# Patient Record
Sex: FEMALE | Race: WHITE | NOT HISPANIC OR LATINO
[De-identification: names, ages, dates, MRNs, and addresses within clinical notes are randomized per-mention and may not be internally consistent; named-entity substitution may affect disease eponyms.]

---

## 2021-10-04 ENCOUNTER — RESULT REVIEW (OUTPATIENT)
Age: 66
End: 2021-10-04

## 2021-11-15 PROBLEM — Z00.00 ENCOUNTER FOR PREVENTIVE HEALTH EXAMINATION: Status: ACTIVE | Noted: 2021-11-15

## 2021-12-10 ENCOUNTER — APPOINTMENT (OUTPATIENT)
Dept: OBGYN | Facility: CLINIC | Age: 66
End: 2021-12-10
Payer: MEDICARE

## 2021-12-10 VITALS
HEIGHT: 59 IN | SYSTOLIC BLOOD PRESSURE: 146 MMHG | DIASTOLIC BLOOD PRESSURE: 86 MMHG | WEIGHT: 195 LBS | BODY MASS INDEX: 39.31 KG/M2

## 2021-12-10 DIAGNOSIS — Z86.79 PERSONAL HISTORY OF OTHER DISEASES OF THE CIRCULATORY SYSTEM: ICD-10-CM

## 2021-12-10 DIAGNOSIS — N95.0 POSTMENOPAUSAL BLEEDING: ICD-10-CM

## 2021-12-10 DIAGNOSIS — Z86.39 PERSONAL HISTORY OF OTHER ENDOCRINE, NUTRITIONAL AND METABOLIC DISEASE: ICD-10-CM

## 2021-12-10 DIAGNOSIS — Z80.1 FAMILY HISTORY OF MALIGNANT NEOPLASM OF TRACHEA, BRONCHUS AND LUNG: ICD-10-CM

## 2021-12-10 PROCEDURE — 99203 OFFICE O/P NEW LOW 30 MIN: CPT

## 2021-12-10 RX ORDER — SIMVASTATIN 10 MG/1
10 TABLET, FILM COATED ORAL
Refills: 0 | Status: ACTIVE | COMMUNITY

## 2021-12-10 RX ORDER — LEVOTHYROXINE SODIUM 137 UG/1
TABLET ORAL
Refills: 0 | Status: ACTIVE | COMMUNITY

## 2021-12-10 NOTE — HISTORY OF PRESENT ILLNESS
[FreeTextEntry1] : FRANSICO LAKE is a 66 year old presenting for consult. Pt with post menopausal bleeding. States last time in August, denies constitutional symptoms. \par TVUS 11/21: 11cm uterus with 1cm endometrium, had an attempt at a d&c with Dr Arias unsuccessful due to stenosis.\par Hx of abnormal pap and colpo 2021 showing SHARMILA 1.\par Denies any family hx of ovarian, endometrial uterine cancer\par denies an hx of DVT\par \par PMH: HTN, HLD, hypothyroidism\par PSH: D&C\par Meds: Simvastatin, Synthroid\par NKDA\par

## 2021-12-10 NOTE — PHYSICAL EXAM
[Appropriately responsive] : appropriately responsive [Alert] : alert [No Acute Distress] : no acute distress [No Lymphadenopathy] : no lymphadenopathy [Soft] : soft [Non-tender] : non-tender [Non-distended] : non-distended [No HSM] : No HSM [No Lesions] : no lesions [No Mass] : no mass [Oriented x3] : oriented x3 [Labia Majora] : normal [Labia Minora] : normal [Normal] : normal [Uterine Adnexae] : normal [FreeTextEntry7] : obese  [Enlarged ___ wks] : enlarged [unfilled] ~Uweeks [FreeTextEntry6] : 12cm uteurs, limited bimanuel due to habitus

## 2022-01-18 ENCOUNTER — OUTPATIENT (OUTPATIENT)
Dept: OUTPATIENT SERVICES | Facility: HOSPITAL | Age: 67
LOS: 1 days | End: 2022-01-18
Payer: MEDICARE

## 2022-01-18 VITALS
TEMPERATURE: 98 F | RESPIRATION RATE: 16 BRPM | OXYGEN SATURATION: 98 % | DIASTOLIC BLOOD PRESSURE: 86 MMHG | HEIGHT: 59 IN | HEART RATE: 101 BPM | SYSTOLIC BLOOD PRESSURE: 139 MMHG | WEIGHT: 192.9 LBS

## 2022-01-18 DIAGNOSIS — N95.0 POSTMENOPAUSAL BLEEDING: ICD-10-CM

## 2022-01-18 DIAGNOSIS — Z98.890 OTHER SPECIFIED POSTPROCEDURAL STATES: Chronic | ICD-10-CM

## 2022-01-18 DIAGNOSIS — Z01.818 ENCOUNTER FOR OTHER PREPROCEDURAL EXAMINATION: ICD-10-CM

## 2022-01-18 LAB
A1C WITH ESTIMATED AVERAGE GLUCOSE RESULT: 5.9 % — HIGH (ref 4–5.6)
ANION GAP SERPL CALC-SCNC: 15 MMOL/L — SIGNIFICANT CHANGE UP (ref 5–17)
BLD GP AB SCN SERPL QL: NEGATIVE — SIGNIFICANT CHANGE UP
BUN SERPL-MCNC: 16 MG/DL — SIGNIFICANT CHANGE UP (ref 7–23)
CALCIUM SERPL-MCNC: 9 MG/DL — SIGNIFICANT CHANGE UP (ref 8.4–10.5)
CHLORIDE SERPL-SCNC: 106 MMOL/L — SIGNIFICANT CHANGE UP (ref 96–108)
CO2 SERPL-SCNC: 22 MMOL/L — SIGNIFICANT CHANGE UP (ref 22–31)
CREAT SERPL-MCNC: 0.68 MG/DL — SIGNIFICANT CHANGE UP (ref 0.5–1.3)
ESTIMATED AVERAGE GLUCOSE: 123 MG/DL — HIGH (ref 68–114)
GLUCOSE SERPL-MCNC: 115 MG/DL — HIGH (ref 70–99)
HCT VFR BLD CALC: 47.7 % — HIGH (ref 34.5–45)
HGB BLD-MCNC: 14.9 G/DL — SIGNIFICANT CHANGE UP (ref 11.5–15.5)
MCHC RBC-ENTMCNC: 28.4 PG — SIGNIFICANT CHANGE UP (ref 27–34)
MCHC RBC-ENTMCNC: 31.2 GM/DL — LOW (ref 32–36)
MCV RBC AUTO: 90.9 FL — SIGNIFICANT CHANGE UP (ref 80–100)
NRBC # BLD: 0 /100 WBCS — SIGNIFICANT CHANGE UP (ref 0–0)
PLATELET # BLD AUTO: 233 K/UL — SIGNIFICANT CHANGE UP (ref 150–400)
POTASSIUM SERPL-MCNC: 3.9 MMOL/L — SIGNIFICANT CHANGE UP (ref 3.5–5.3)
POTASSIUM SERPL-SCNC: 3.9 MMOL/L — SIGNIFICANT CHANGE UP (ref 3.5–5.3)
RBC # BLD: 5.25 M/UL — HIGH (ref 3.8–5.2)
RBC # FLD: 14.5 % — SIGNIFICANT CHANGE UP (ref 10.3–14.5)
RH IG SCN BLD-IMP: POSITIVE — SIGNIFICANT CHANGE UP
SODIUM SERPL-SCNC: 143 MMOL/L — SIGNIFICANT CHANGE UP (ref 135–145)
WBC # BLD: 8.72 K/UL — SIGNIFICANT CHANGE UP (ref 3.8–10.5)
WBC # FLD AUTO: 8.72 K/UL — SIGNIFICANT CHANGE UP (ref 3.8–10.5)

## 2022-01-18 PROCEDURE — 86901 BLOOD TYPING SEROLOGIC RH(D): CPT

## 2022-01-18 PROCEDURE — 87086 URINE CULTURE/COLONY COUNT: CPT

## 2022-01-18 PROCEDURE — 86900 BLOOD TYPING SEROLOGIC ABO: CPT

## 2022-01-18 PROCEDURE — 80048 BASIC METABOLIC PNL TOTAL CA: CPT

## 2022-01-18 PROCEDURE — 85027 COMPLETE CBC AUTOMATED: CPT

## 2022-01-18 PROCEDURE — 86850 RBC ANTIBODY SCREEN: CPT

## 2022-01-18 PROCEDURE — G0463: CPT

## 2022-01-18 PROCEDURE — 83036 HEMOGLOBIN GLYCOSYLATED A1C: CPT

## 2022-01-18 RX ORDER — CEFAZOLIN SODIUM 1 G
2000 VIAL (EA) INJECTION ONCE
Refills: 0 | Status: DISCONTINUED | OUTPATIENT
Start: 2022-01-31 | End: 2022-02-14

## 2022-01-18 NOTE — H&P PST ADULT - HISTORY OF PRESENT ILLNESS
66 year old postmenopausal female,  with PMH of Hypothyroidism, HLD, Vitamin D Deficiency with complaint of post-menopausal bleeding. Patient reports that the last episode of bleeding was last August.  She reports that she had an attempt at a D&C, which was unsuccessful due to stenosis of Cervix. Denies fever, chills, continuous bleeding, family history of Ovarian Cancer, or Uterine Cancer. She presents to [facility] today for evaluation prior to scheduled Total Laparoscopic Hysterectomy, Laparoscopic Bilateral Salpingo-oophorectomy,  Cystoscopy, ERP on 2022.      preop covid screen  @ [facility]

## 2022-01-18 NOTE — H&P PST ADULT - OTHER CARE PROVIDERS
Cardiologist-- Dr. Du Thao, [place], [phone]// Endocrinologst--Maggie Lara, [place], [phone]

## 2022-01-18 NOTE — H&P PST ADULT - NEGATIVE ENDOCRINE SYMPTOMS
Admitted with hemoglobin of 11 5, hemoglobin today is 6 3  -repeat hemoglobin is still 6 3, will transfuse 1 unit PRBC  -will check stool occult  -monitor H&H and transfuse further as needed  -thoroughly examined patient, no abdominal pain, no back pain, no areas of ecchymosis or hematoma visualized no cold intolerance/no heat intolerance

## 2022-01-18 NOTE — H&P PST ADULT - WEIGHT IN LBS
192.9 No. MARGE screening performed.  STOP BANG Legend: 0-2 = LOW Risk; 3-4 = INTERMEDIATE Risk; 5-8 = HIGH Risk

## 2022-01-18 NOTE — H&P PST ADULT - PROBLEM SELECTOR PLAN 1
Total Laparoscopic Hysterectomy  Laparoscopic Bilateral Salpingo-oophorectomy,  Cystoscopy  ERP  -cbc, bmp, type and screen, urine culture, A1c done at [facility]  -medical evaluation  -preop instructions  -ERP protocol  -pre-emptive analgesia protocol  -ABO day of surgery  -fingerstick on admit

## 2022-01-18 NOTE — H&P PST ADULT - NSANTHAGERD_ENT_A_CORE
Fredy Abreu (MD)  Orthopaedic Surgery  [address] [place] [address]  Phone: [phone]  Fax: [phone]  Follow Up Time:
Yes

## 2022-01-18 NOTE — H&P PST ADULT - MUSCULOSKELETAL
ROM intact/no joint swelling/no joint erythema/no joint warmth/no calf tenderness/normal strength detailed exam negative

## 2022-01-18 NOTE — H&P PST ADULT - NSICDXPASTSURGICALHX_GEN_ALL_CORE_FT
PAST SURGICAL HISTORY:  H/O breast biopsy -bilaterally    S/P colonoscopy     S/P dilation and curettage -Hysteroscopy-attempted, patient states procedure incomplete due to close Cervix, 2021

## 2022-01-18 NOTE — H&P PST ADULT - NSANTHOSAYNRD_GEN_A_CORE
No. SOFY screening performed.  STOP BANG Legend: 0-2 = LOW Risk; 3-4 = INTERMEDIATE Risk; 5-8 = HIGH Risk

## 2022-01-18 NOTE — H&P PST ADULT - ATTENDING COMMENTS
Patient with PMB, plan for definitive surgical management. Discussed plan for EUA, TLHBSO, cystoscopy. understands learners are part of her case and performing EUA. Reviewed post operative course. Discussed risks of surgery including but not limited to VTE, SSI, damage to bowel, bladder need for transfusion, laparotomy. Reviewed underlying malignancy. Understands if final pathology shows malignancy may need another surgery. All questions answered.   Blanka Garcia MD

## 2022-01-18 NOTE — H&P PST ADULT - FALL HARM RISK - UNIVERSAL INTERVENTIONS
Bed in lowest position, wheels locked, appropriate side rails in place/Call bell, personal items and telephone in reach/Instruct patient to call for assistance before getting out of bed or chair/Non-slip footwear when patient is out of bed/[place] to call system/Physically safe environment - no spills, clutter or unnecessary equipment/Purposeful Proactive Rounding/Room/bathroom lighting operational, light cord in reach

## 2022-01-19 LAB
CULTURE RESULTS: SIGNIFICANT CHANGE UP
SPECIMEN SOURCE: SIGNIFICANT CHANGE UP

## 2022-01-20 PROBLEM — E78.5 HYPERLIPIDEMIA, UNSPECIFIED: Chronic | Status: ACTIVE | Noted: 2022-01-18

## 2022-01-20 PROBLEM — E55.9 VITAMIN D DEFICIENCY, UNSPECIFIED: Chronic | Status: ACTIVE | Noted: 2022-01-18

## 2022-01-20 PROBLEM — E03.9 HYPOTHYROIDISM, UNSPECIFIED: Chronic | Status: ACTIVE | Noted: 2022-01-18

## 2022-01-28 ENCOUNTER — OUTPATIENT (OUTPATIENT)
Dept: OUTPATIENT SERVICES | Facility: HOSPITAL | Age: 67
LOS: 1 days | End: 2022-01-28
Payer: MEDICARE

## 2022-01-28 DIAGNOSIS — Z98.890 OTHER SPECIFIED POSTPROCEDURAL STATES: Chronic | ICD-10-CM

## 2022-01-28 DIAGNOSIS — Z11.52 ENCOUNTER FOR SCREENING FOR COVID-19: ICD-10-CM

## 2022-01-28 LAB — SARS-COV-2 RNA SPEC QL NAA+PROBE: SIGNIFICANT CHANGE UP

## 2022-01-28 PROCEDURE — U0003: CPT

## 2022-01-28 PROCEDURE — U0005: CPT

## 2022-01-28 PROCEDURE — C9803: CPT

## 2022-01-30 ENCOUNTER — TRANSCRIPTION ENCOUNTER (OUTPATIENT)
Age: 67
End: 2022-01-30

## 2022-01-31 ENCOUNTER — OUTPATIENT (OUTPATIENT)
Dept: OUTPATIENT SERVICES | Facility: HOSPITAL | Age: 67
LOS: 1 days | End: 2022-01-31
Payer: MEDICARE

## 2022-01-31 ENCOUNTER — APPOINTMENT (OUTPATIENT)
Dept: OBGYN | Facility: HOSPITAL | Age: 67
End: 2022-01-31

## 2022-01-31 ENCOUNTER — RESULT REVIEW (OUTPATIENT)
Age: 67
End: 2022-01-31

## 2022-01-31 VITALS
RESPIRATION RATE: 18 BRPM | WEIGHT: 192.9 LBS | HEIGHT: 59 IN | DIASTOLIC BLOOD PRESSURE: 83 MMHG | SYSTOLIC BLOOD PRESSURE: 150 MMHG | HEART RATE: 92 BPM | OXYGEN SATURATION: 97 % | TEMPERATURE: 98 F

## 2022-01-31 VITALS
HEART RATE: 77 BPM | OXYGEN SATURATION: 97 % | TEMPERATURE: 97 F | RESPIRATION RATE: 16 BRPM | DIASTOLIC BLOOD PRESSURE: 77 MMHG | SYSTOLIC BLOOD PRESSURE: 113 MMHG

## 2022-01-31 DIAGNOSIS — Z98.890 OTHER SPECIFIED POSTPROCEDURAL STATES: Chronic | ICD-10-CM

## 2022-01-31 DIAGNOSIS — N95.0 POSTMENOPAUSAL BLEEDING: ICD-10-CM

## 2022-01-31 LAB
GLUCOSE BLDC GLUCOMTR-MCNC: 112 MG/DL — HIGH (ref 70–99)
RH IG SCN BLD-IMP: POSITIVE — SIGNIFICANT CHANGE UP

## 2022-01-31 PROCEDURE — C9399: CPT

## 2022-01-31 PROCEDURE — C1765: CPT

## 2022-01-31 PROCEDURE — C1889: CPT

## 2022-01-31 PROCEDURE — 88307 TISSUE EXAM BY PATHOLOGIST: CPT

## 2022-01-31 PROCEDURE — 88307 TISSUE EXAM BY PATHOLOGIST: CPT | Mod: 26

## 2022-01-31 PROCEDURE — 58571 TLH W/T/O 250 G OR LESS: CPT

## 2022-01-31 PROCEDURE — 82962 GLUCOSE BLOOD TEST: CPT

## 2022-01-31 PROCEDURE — 58570 TLH UTERUS 250 G OR LESS: CPT

## 2022-01-31 DEVICE — INTERCEED 3 X 4"
Type: IMPLANTABLE DEVICE | Site: BILATERAL | Status: NON-FUNCTIONAL
Removed: 2022-01-31

## 2022-01-31 DEVICE — VISTASEAL FIBRIN HUMAN 4ML
Type: IMPLANTABLE DEVICE | Site: BILATERAL | Status: NON-FUNCTIONAL
Removed: 2022-01-31

## 2022-01-31 DEVICE — CLIP APPLIER COVIDIEN ENDOCLIP III 5MM
Type: IMPLANTABLE DEVICE | Site: BILATERAL | Status: NON-FUNCTIONAL
Removed: 2022-01-31

## 2022-01-31 RX ORDER — IBUPROFEN 200 MG
600 TABLET ORAL ONCE
Refills: 0 | Status: COMPLETED | OUTPATIENT
Start: 2022-01-31 | End: 2022-01-31

## 2022-01-31 RX ORDER — LEVOTHYROXINE SODIUM 125 MCG
1 TABLET ORAL
Qty: 0 | Refills: 0 | DISCHARGE

## 2022-01-31 RX ORDER — SIMVASTATIN 20 MG/1
1 TABLET, FILM COATED ORAL
Qty: 0 | Refills: 0 | DISCHARGE

## 2022-01-31 RX ORDER — OXYCODONE HYDROCHLORIDE 5 MG/1
1 TABLET ORAL
Qty: 5 | Refills: 0
Start: 2022-01-31

## 2022-01-31 RX ORDER — SIMETHICONE 80 MG/1
1 TABLET, CHEWABLE ORAL
Qty: 56 | Refills: 0
Start: 2022-01-31 | End: 2022-02-13

## 2022-01-31 RX ORDER — LIDOCAINE HCL 20 MG/ML
0.2 VIAL (ML) INJECTION ONCE
Refills: 0 | Status: DISCONTINUED | OUTPATIENT
Start: 2022-01-31 | End: 2022-01-31

## 2022-01-31 RX ORDER — NEBIVOLOL HYDROCHLORIDE 5 MG/1
1 TABLET ORAL
Qty: 0 | Refills: 0 | DISCHARGE

## 2022-01-31 RX ORDER — CHLORHEXIDINE GLUCONATE 213 G/1000ML
1 SOLUTION TOPICAL ONCE
Refills: 0 | Status: DISCONTINUED | OUTPATIENT
Start: 2022-01-31 | End: 2022-01-31

## 2022-01-31 RX ORDER — CHOLECALCIFEROL (VITAMIN D3) 125 MCG
1 CAPSULE ORAL
Qty: 0 | Refills: 0 | DISCHARGE

## 2022-01-31 RX ORDER — KETOROLAC TROMETHAMINE 30 MG/ML
30 SYRINGE (ML) INJECTION ONCE
Refills: 0 | Status: DISCONTINUED | OUTPATIENT
Start: 2022-01-31 | End: 2022-01-31

## 2022-01-31 RX ORDER — IBUPROFEN 200 MG
1 TABLET ORAL
Qty: 56 | Refills: 0
Start: 2022-01-31 | End: 2022-02-13

## 2022-01-31 RX ORDER — HYDROMORPHONE HYDROCHLORIDE 2 MG/ML
0.5 INJECTION INTRAMUSCULAR; INTRAVENOUS; SUBCUTANEOUS
Refills: 0 | Status: DISCONTINUED | OUTPATIENT
Start: 2022-01-31 | End: 2022-01-31

## 2022-01-31 RX ORDER — ACETAMINOPHEN 500 MG
1000 TABLET ORAL ONCE
Refills: 0 | Status: COMPLETED | OUTPATIENT
Start: 2022-01-31 | End: 2022-01-31

## 2022-01-31 RX ORDER — SENNA PLUS 8.6 MG/1
2 TABLET ORAL
Qty: 28 | Refills: 0
Start: 2022-01-31 | End: 2022-02-13

## 2022-01-31 RX ORDER — CELECOXIB 200 MG/1
400 CAPSULE ORAL ONCE
Refills: 0 | Status: COMPLETED | OUTPATIENT
Start: 2022-01-31 | End: 2022-01-31

## 2022-01-31 RX ORDER — DOCUSATE SODIUM 100 MG
50 CAPSULE ORAL
Qty: 700 | Refills: 0
Start: 2022-01-31 | End: 2022-02-13

## 2022-01-31 RX ORDER — ACETAMINOPHEN 500 MG
3 TABLET ORAL
Qty: 168 | Refills: 0
Start: 2022-01-31 | End: 2022-02-13

## 2022-01-31 RX ORDER — SODIUM CHLORIDE 9 MG/ML
3 INJECTION INTRAMUSCULAR; INTRAVENOUS; SUBCUTANEOUS EVERY 8 HOURS
Refills: 0 | Status: DISCONTINUED | OUTPATIENT
Start: 2022-01-31 | End: 2022-01-31

## 2022-01-31 RX ORDER — ONDANSETRON 8 MG/1
1 TABLET, FILM COATED ORAL
Qty: 21 | Refills: 0
Start: 2022-01-31 | End: 2022-02-06

## 2022-01-31 RX ORDER — GABAPENTIN 400 MG/1
300 CAPSULE ORAL ONCE
Refills: 0 | Status: COMPLETED | OUTPATIENT
Start: 2022-01-31 | End: 2022-01-31

## 2022-01-31 RX ORDER — DOCUSATE SODIUM 100 MG
1 CAPSULE ORAL
Qty: 14 | Refills: 0
Start: 2022-01-31 | End: 2022-02-06

## 2022-01-31 RX ORDER — ONDANSETRON 8 MG/1
4 TABLET, FILM COATED ORAL ONCE
Refills: 0 | Status: COMPLETED | OUTPATIENT
Start: 2022-01-31 | End: 2022-01-31

## 2022-01-31 RX ADMIN — Medication 600 MILLIGRAM(S): at 15:08

## 2022-01-31 RX ADMIN — Medication 1000 MILLIGRAM(S): at 12:39

## 2022-01-31 RX ADMIN — ONDANSETRON 4 MILLIGRAM(S): 8 TABLET, FILM COATED ORAL at 14:54

## 2022-01-31 RX ADMIN — Medication 30 MILLIGRAM(S): at 12:40

## 2022-01-31 RX ADMIN — GABAPENTIN 300 MILLIGRAM(S): 400 CAPSULE ORAL at 05:42

## 2022-01-31 RX ADMIN — Medication 600 MILLIGRAM(S): at 15:38

## 2022-01-31 RX ADMIN — Medication 1000 MILLIGRAM(S): at 05:43

## 2022-01-31 RX ADMIN — HYDROMORPHONE HYDROCHLORIDE 0.5 MILLIGRAM(S): 2 INJECTION INTRAMUSCULAR; INTRAVENOUS; SUBCUTANEOUS at 11:54

## 2022-01-31 RX ADMIN — CELECOXIB 400 MILLIGRAM(S): 200 CAPSULE ORAL at 05:42

## 2022-01-31 RX ADMIN — Medication 400 MILLIGRAM(S): at 12:09

## 2022-01-31 RX ADMIN — HYDROMORPHONE HYDROCHLORIDE 0.5 MILLIGRAM(S): 2 INJECTION INTRAMUSCULAR; INTRAVENOUS; SUBCUTANEOUS at 12:39

## 2022-01-31 RX ADMIN — Medication 30 MILLIGRAM(S): at 12:09

## 2022-01-31 NOTE — BRIEF OPERATIVE NOTE - OPERATION/FINDINGS
EUA- mobile 12wk uterus, no adnexal masses, normal vagina and cervix  Laparoscopic  - 12wk uterus with right fundal 3cm type 6 fibroid, normal appearing bilateral fallopian tubes and ovaries  - normal appendix  - normal liver edge  Cystoscopy  - normal and intact bladder mucosa, brisk bilateral ureteral jets

## 2022-01-31 NOTE — BRIEF OPERATIVE NOTE - NSICDXBRIEFPROCEDURE_GEN_ALL_CORE_FT
PROCEDURES:  Hysterectomy, total, laparoscopic, with BSO and cystourethroscopy 31-Jan-2022 12:00:02  Jasmin Haro

## 2022-01-31 NOTE — ASU DISCHARGE PLAN (ADULT/PEDIATRIC) - CARE PROVIDER_API CALL
Blanka Garcia)  OBSGYN  General [address]25  [address] [facility], [address] [place] [address]  Phone: [phone]  Fax: [phone]  Established Patient  Follow Up Time: 2 weeks

## 2022-01-31 NOTE — CHART NOTE - NSCHARTNOTEFT_GEN_A_CORE
1300    R1 GYN POST-OP CHECK NOTE    SUBJECTIVE:    67yo F now POD0 s/p TLH, BSO, and cysto    Pt reports pain well controlled by pain meds. She is not yet out of bed.  De [place] Paz cath removed in the OR. Patient had attempted to void prior, but upon eval, she endorses feeling the need to void. Has not yet had PO. She denies fever, chills, nausea, vomiting, headache, dizziness, chest pain, palpitations, shortness of breath.    ceFAZolin   IVPB 2000 milliGRAM(s) IV Intermittent once  HYDROmorphone  Injectable 0.5 milliGRAM(s) IV Push every 15 minutes PRN  ondansetron Injectable 4 milliGRAM(s) IV Push once PRN      OBJECTIVE:    VITAL SIGNS:  Vital Signs Last 24 Hrs  T(C): 36.2 (31 Jan 2022 11:27), Max: 36.6 (31 Jan 2022 05:49)  T(F): 97.2 (31 Jan 2022 11:27), Max: 97.9 (31 Jan 2022 05:49)  HR: 76 (31 Jan 2022 13:00) (72 - 92)  BP: 112/56 (31 Jan 2022 13:00) (112/56 - 150/83)  BP(mean): 79 (31 Jan 2022 13:00) (79 - 91)  RR: 16 (31 Jan 2022 13:00) (16 - 18)  SpO2: 94% (31 Jan 2022 13:00) (93% - 97%)  CAPILLARY BLOOD GLUCOSE      POCT Blood Glucose.: 112 mg/dL (31 Jan 2022 05:40)      PHYSICAL EXAM:  GEN: No acute distress  CV: Regular rate and rhythm. No murmurs appreciated   LUNGS: Clear to auscultation anteriorly bilaterally. No respiratory distress  ABD: Soft. Mildly distended. Non-tender.  Incision: Lsc incision sites (x3) c/d/i. Umbilical site w/ overlying obsite and lateral incision sites w/ steri-strips   EXT: No calf tenderness. SCDs in place         ASSESSMENT:  67yo F now POD0 s/p TLH, BSO, and cysto. Patient is stable and progressing normally postoperatively.    NEURO: Pain controlled on current regimen  CV: Hemodynamically stable  PULM: Saturating well on RA  GI: Will monitor for pt ability to tolerate PO  : Will monitor for spontaneous void. DTV @754q  HEME: SCDs, early ambulation, and OOB  ID: Afebrile  Dispo: Discharge planning for home if aforementioned GI and  milestones are met     Javier Marks, PGY-1  Obstetrics and Gynecology

## 2022-01-31 NOTE — PRE-ANESTHESIA EVALUATION ADULT - NSANTHPMHFT_GEN_ALL_CORE
HTN dx 5 days ago on rx     66 year old postmenopausal female,  with PMH of Hypothyroidism, HLD, Vitamin D Deficiency with complaint of post-menopausal bleeding. Patient reports that the last episode of bleeding was last August.  She reports that she had an attempt at a D&C, which was unsuccessful due to stenosis of Cervix. Denies fever, chills, continuous bleeding, family history of Ovarian Cancer, or Uterine Cancer.  good ET nonCP/SOB

## 2022-01-31 NOTE — PATIENT PROFILE ADULT - FALL HARM RISK - UNIVERSAL INTERVENTIONS
Bed in lowest position, wheels locked, appropriate side rails in place/Call bell, personal items and telephone in reach/Instruct patient to call for assistance before getting out of bed or chair/Non-slip footwear when patient is out of bed/[place] to call system/Physically safe environment - no spills, clutter or unnecessary equipment/Purposeful Proactive Rounding/Room/bathroom lighting operational, light cord in reach

## 2022-02-15 ENCOUNTER — APPOINTMENT (OUTPATIENT)
Dept: OBGYN | Facility: CLINIC | Age: 67
End: 2022-02-15
Payer: MEDICARE

## 2022-02-15 VITALS
SYSTOLIC BLOOD PRESSURE: 138 MMHG | WEIGHT: 195 LBS | HEIGHT: 59 IN | DIASTOLIC BLOOD PRESSURE: 74 MMHG | BODY MASS INDEX: 39.31 KG/M2

## 2022-02-15 LAB — SURGICAL PATHOLOGY STUDY: SIGNIFICANT CHANGE UP

## 2022-02-15 PROCEDURE — 99024 POSTOP FOLLOW-UP VISIT: CPT

## 2022-02-15 NOTE — HISTORY OF PRESENT ILLNESS
[Pain is well-controlled] : pain is well-controlled [Clean/Dry/Intact] : clean, dry and intact [Healed] : healed [Pathology reviewed] : pathology reviewed [Fever] : no fever [Chills] : no chills [Nausea] : no nausea [Vomiting] : no vomiting [Diarrhea] : no diarrhea [Vaginal Bleeding] : no vaginal bleeding [Pelvic Pressure] : no pelvic pressure [Dysuria] : no dysuria [Vaginal Discharge] : no vaginal discharge [Constipation] : no constipation [Erythema] : not erythematous [Swelling] : not swollen [Dehiscence] : not dehisced [Discharge] : absent of discharge [de-identified] : 01/31/22 [de-identified] : 1/31/22 FARZANA/SIVA [de-identified] : FRANS[place] LAKE 66 year old F pt presents for post-op evaluation. Procedure Day:1/31/22 TLH/BSO. Sates she is constipated at times.

## 2022-02-15 NOTE — PLAN
[Sutures Removed] : sutures were removed [Staples Removed] : staples were removed [de-identified] : Keep incision clean and dry, clean using hydrogen peroxide. RTO in 4 weeks for vaginal check.  Blanka Garcia MD

## 2022-02-15 NOTE — END OF VISIT
[FreeTextEntry3] : I, Emma Acevedoedgar acted as a scribe on behalf of Dr. Garcia on 02/15/2022 \par \par All medical entries made by the scribe were at my, Dr. Garcia, direction and personally dictated by me on 02/15/2022. I have reviewed the chart and agree that the record accurately reflects my personal performance of the history, physical exam, assessment and plan. I have also personally directed, reviewed, and agreed with the chart.\par

## 2022-03-22 ENCOUNTER — APPOINTMENT (OUTPATIENT)
Dept: OBGYN | Facility: CLINIC | Age: 67
End: 2022-03-22
Payer: MEDICARE

## 2022-03-22 VITALS
SYSTOLIC BLOOD PRESSURE: 124 MMHG | WEIGHT: 192 LBS | DIASTOLIC BLOOD PRESSURE: 70 MMHG | BODY MASS INDEX: 38.71 KG/M2 | HEIGHT: 59 IN

## 2022-03-22 PROCEDURE — 99024 POSTOP FOLLOW-UP VISIT: CPT

## 2022-03-22 NOTE — PLAN
[Sutures Removed] : sutures were removed [Staples Removed] : staples were removed [None] : None [FreeTextEntry1] : FRANS[place] LAKE 66 year old menopausal at age 51 F pt presents for Post-Op evaluation. Post-Op Day:1/31/22 Procedure: Total laparoscopic.\par \par -Pt is cleared for all activities. \par -Advised pt nothing in vaginal after 12 weeks of surgery. \par -To FU with PT for back pain. \par -RTO 1 year for annual. \par \par

## 2022-03-22 NOTE — HISTORY OF PRESENT ILLNESS
for pt to return call    ----- Message from Vickie Barreto MA sent at 7/9/2019  5:19 PM EDT -----  Regarding: FW: Question regarding prozac change      ----- Message -----  From: Jimmy Ivan Jr., MD  Sent: 7/9/2019   5:00 PM  To: Siena Edwards MA  Subject: FW: Question regarding prozac change             Check with patient about intolerance of side effects to other antidepressants she has had regarding their message I think is very reasonable for patient to try Lexapro if she is naïve to that if that would permit her to use tamoxifen.  Risk-benefit ratio deftly ongoing for the tamoxifen.  If she is going to the Lexapro suggest starting 10 mg a time it is 1/2 tablet daily for 1 week going to whole tablet daily the patient and contacting us in 1 month see how that is working.  If there has been some adverse events or effects with Lexapro please let me know.  ----- Message -----  From: Sofiya Mckinley APRN  Sent: 7/9/2019   4:17 PM  To: Jimmy Ivan Jr., MD  Subject: Question regarding prozac change                 Dr. Ivan,    We are following Ms. Butt for a history of breast cancer.  She has been unable to tolerate any of the aromatase inhibitor therapies as adjuvant therapy. We would like to try her on tamoxifen but this interacts strongly with Prozac, which she tells me she has been on for several years, initiated for depression after her stroke.    I wanted to get your thoughts on switching her to a different antidepressant, such as Lexapro, Celexa, or Effexor.  Do you have an opinion about which one to use and desired dosing for this 80-year-old patient?  I appreciate your help with this.    JESSA Nicole, GERARDP with the [facility]       [Pain is well-controlled] : pain is well-controlled [Clean/Dry/Intact] : clean, dry and intact [Healed] : healed [None] : no vaginal bleeding [Normal] : normal [Fever] : no fever [Chills] : no chills [Nausea] : no nausea [Vomiting] : no vomiting [Diarrhea] : no diarrhea [Vaginal Bleeding] : no vaginal bleeding [Pelvic Pressure] : no pelvic pressure [Dysuria] : no dysuria [Vaginal Discharge] : no vaginal discharge [Constipation] : no constipation [Erythema] : not erythematous [Swelling] : not swollen [Dehiscence] : not dehisced [Discharge] : absent of discharge [Hematoma] : no vaginal hematoma [Abscess Formation] : no vaginal abscess [Pathology reviewed] : pathology reviewed [de-identified] : 1/31/22 [de-identified] : TLHBSO  [de-identified] : [place] LAKE 66 year old menopausal at age 51 F pt presents for Post-Op evaluation. Post-Op Day:1/31/22 Procedure: Total laparoscopic justerectomy, BSO.  Pt c/o pain in hips and back, she stated these sx started after surgery when she performs intense activity, states not bothersome . Denies any vaginal bleeding.  [de-identified] : cuff intact healing well

## 2022-03-22 NOTE — ASSESSMENT
[Doing Well] : is doing well [No Sign of Infection] : is showing no signs of infection [de-identified] : full return to activity\par pelvic rest till 10-12 weeks post op\par Blanka Garcia MD

## 2022-03-22 NOTE — END OF VISIT
[FreeTextEntry3] : I, Emma Acevedoedgar acted as a scribe on behalf of Dr. Garcia on 03/22/2022 \par \par All medical entries made by the scribe were at my, Dr. Garcia, direction and personally dictated by me on 03/22/2022. I have reviewed the chart and agree that the record accurately reflects my personal performance of the history, physical exam, assessment and plan. I have also personally directed, reviewed, and agreed with the chart.\par
